# Patient Record
Sex: MALE | HISPANIC OR LATINO | Employment: FULL TIME | URBAN - METROPOLITAN AREA
[De-identification: names, ages, dates, MRNs, and addresses within clinical notes are randomized per-mention and may not be internally consistent; named-entity substitution may affect disease eponyms.]

---

## 2021-06-25 ENCOUNTER — TELEPHONE (OUTPATIENT)
Dept: GASTROENTEROLOGY | Facility: CLINIC | Age: 41
End: 2021-06-25

## 2021-06-25 ENCOUNTER — OFFICE VISIT (OUTPATIENT)
Dept: GASTROENTEROLOGY | Facility: CLINIC | Age: 41
End: 2021-06-25

## 2021-06-25 VITALS — TEMPERATURE: 97.7 F | WEIGHT: 174 LBS | BODY MASS INDEX: 27.97 KG/M2 | HEIGHT: 66 IN

## 2021-06-25 DIAGNOSIS — R63.0 POOR APPETITE: ICD-10-CM

## 2021-06-25 DIAGNOSIS — R10.13 EPIGASTRIC PAIN: Primary | ICD-10-CM

## 2021-06-25 PROCEDURE — 99204 OFFICE O/P NEW MOD 45 MIN: CPT | Performed by: NURSE PRACTITIONER

## 2021-06-25 NOTE — PROGRESS NOTES
Chief Complaint   Patient presents with   • Abdominal Pain   • Bloated     HPI    Lennox Powell is a  40 y.o. male here to establish care as a new patient for complaints of epigastric pain and bloating.  Symptoms have been ongoing for approximately 4 months.  Patient was seen in the emergency room in [facility] on Friday and fortunately records unavailable for review in care everywhere.  Patient reports having normal lab work and a normal abdominal x-ray.  He was started on omeprazole 40 mg once daily by his PCP yesterday.  Pain is episodic in nature described as an aching sensation that comes and goes.  There is associated poor appetite but no weight loss.  No dysphagia or odynophagia.    No lower GI complaints.    He still has his gallbladder.    No family history of colon cancer.    (This patient will also follow with Dr. Ackerman)       Past Medical History:   Diagnosis Date   • Hypertension        History reviewed. No pertinent surgical history.    Scheduled Meds:  Outpatient Encounter Medications as of 6/25/2021   Medication Sig Dispense Refill   • lisinopril (PRINIVIL,ZESTRIL) 5 MG tablet Take 10 mg by mouth Daily.       No facility-administered encounter medications on file as of 6/25/2021.       Continuous Infusions:No current facility-administered medications for this visit.      PRN Meds:.    No Known Allergies    Social History     Socioeconomic History   • Marital status: Single     Spouse name: Not on file   • Number of children: Not on file   • Years of education: Not on file   • Highest education level: Not on file   Tobacco Use   • Smoking status: Never Smoker   Substance and Sexual Activity   • Alcohol use: No   • Drug use: No       History reviewed. No pertinent family history.    Review of Systems   Constitutional: Positive for appetite change. Negative for activity change, fatigue, fever and unexpected weight change.   HENT: Negative for trouble swallowing.    Eyes: Negative.     Respiratory: Negative for apnea, cough, choking, chest tightness, shortness of breath and wheezing.    Cardiovascular: Negative for chest pain, palpitations and leg swelling.   Gastrointestinal: Positive for abdominal pain. Negative for abdominal distention, anal bleeding, blood in stool, constipation, diarrhea, nausea, rectal pain and vomiting.   Endocrine: Negative.    Genitourinary: Negative.    Musculoskeletal: Negative.    Skin: Negative.    Allergic/Immunologic: Negative.    Neurological: Negative.    Hematological: Negative.    Psychiatric/Behavioral: Negative.        Vitals:    06/25/21 0854   Temp: 97.7 °F (36.5 °C)       Physical Exam  Constitutional:       Appearance: He is well-developed.   HENT:      Head: Normocephalic.      Nose: Nose normal.   Eyes:      Pupils: Pupils are equal, round, and reactive to light.   Cardiovascular:      Rate and Rhythm: Normal rate and regular rhythm.      Heart sounds: Normal heart sounds.   Pulmonary:      Effort: Pulmonary effort is normal. No respiratory distress.      Breath sounds: Normal breath sounds. No wheezing.   Abdominal:      General: Bowel sounds are normal. There is no distension.      Palpations: Abdomen is soft. There is no mass.      Tenderness: There is no abdominal tenderness. There is no guarding.      Hernia: No hernia is present.   Musculoskeletal:         General: Normal range of motion.      Cervical back: Normal range of motion.   Skin:     General: Skin is warm and dry.      Capillary Refill: Capillary refill takes less than 2 seconds.   Neurological:      Mental Status: He is alert and oriented to person, place, and time.   Psychiatric:         Behavior: Behavior normal.     Assessment    Epigastric pain  Poor appetite    Plan    Arrange EGD with Dr. Ackerman for the aforementioned symptoms.  Rule out gastritis, H. pylori, peptic ulcer disease, esophagitis, hiatal hernia, etc.  Continue recently started PPI therapy.  Risk/benefits of procedure  reviewed with the patient with the help of the .  If the aforementioned work-up found to be unremarkable moved to gallbladder evaluation.  We will get a copy of his work-up in the ER at [facility] on Friday.

## 2021-07-07 ENCOUNTER — TELEPHONE (OUTPATIENT)
Dept: GASTROENTEROLOGY | Facility: CLINIC | Age: 41
End: 2021-07-07

## 2021-07-07 DIAGNOSIS — R59.0 MESENTERIC LYMPHADENOPATHY: Primary | ICD-10-CM

## 2021-07-07 NOTE — TELEPHONE ENCOUNTER
----- Message from MAGNUS Chirinos sent at 6/25/2021  9:29 AM EDT -----  Can we get a copy of his ER work-up at [facility] Friday for some reason is not loading in care everywhere.

## 2021-07-07 NOTE — TELEPHONE ENCOUNTER
Reviewed ER evaluation performed at [facility] on 18 June.  CT shows indistinct inflamed appearing lymph nodes present within the mesentery.  Nonspecific.  Questionable reactive process secondary to systemic infectious or inflammatory process.  Follow-up examination in 6 months suggested to assess interval change.  No evidence of acute GI abnormalities.  Normal hemogram.  Normal LFTs.    Lets make sure this patient gets a follow-up CT abdomen and pelvis in November due to findings of lymphadenopathy.

## 2021-07-26 NOTE — TELEPHONE ENCOUNTER
Patient called through [facility] . No answer. Letter mailed.   Order placed for CT scan to be done in Nov.

## 2021-08-04 ENCOUNTER — OUTSIDE FACILITY SERVICE (OUTPATIENT)
Dept: GASTROENTEROLOGY | Facility: CLINIC | Age: 41
End: 2021-08-04

## 2021-08-04 DIAGNOSIS — R14.0 ABDOMINAL BLOATING: Primary | ICD-10-CM

## 2021-08-04 PROCEDURE — 43239 EGD BIOPSY SINGLE/MULTIPLE: CPT | Performed by: INTERNAL MEDICINE

## 2021-08-12 NOTE — PROGRESS NOTES
Pathology benignI have ordered an ultrasound and HIDA scan but you will need to call her family members who speak English to get it scheduled thank you

## 2021-08-25 ENCOUNTER — TELEPHONE (OUTPATIENT)
Dept: GASTROENTEROLOGY | Facility: CLINIC | Age: 41
End: 2021-08-25

## 2021-08-25 DIAGNOSIS — R10.13 EPIGASTRIC PAIN: Primary | ICD-10-CM

## 2021-08-25 NOTE — TELEPHONE ENCOUNTER
----- Message from Juan J Ackerman MD sent at 8/12/2021  4:15 PM EDT -----  Pathology benignI have ordered an ultrasound and HIDA scan but you will need to call her family members who speak English to get it scheduled thank you